# Patient Record
Sex: FEMALE | Employment: OTHER | URBAN - METROPOLITAN AREA
[De-identification: names, ages, dates, MRNs, and addresses within clinical notes are randomized per-mention and may not be internally consistent; named-entity substitution may affect disease eponyms.]

---

## 2018-11-17 ENCOUNTER — APPOINTMENT (OUTPATIENT)
Dept: MRI IMAGING | Age: 73
End: 2018-11-17
Attending: INTERNAL MEDICINE
Payer: MEDICARE

## 2018-11-17 ENCOUNTER — APPOINTMENT (OUTPATIENT)
Dept: CT IMAGING | Age: 73
End: 2018-11-17
Attending: INTERNAL MEDICINE
Payer: MEDICARE

## 2018-11-17 ENCOUNTER — HOSPITAL ENCOUNTER (OUTPATIENT)
Age: 73
Setting detail: OBSERVATION
Discharge: HOME | End: 2018-11-18
Attending: HOSPITALIST | Admitting: INTERNAL MEDICINE
Payer: MEDICARE

## 2018-11-17 ENCOUNTER — APPOINTMENT (OUTPATIENT)
Dept: GENERAL RADIOLOGY | Age: 73
End: 2018-11-17
Attending: INTERNAL MEDICINE
Payer: MEDICARE

## 2018-11-17 DIAGNOSIS — R55 SYNCOPE AND COLLAPSE: ICD-10-CM

## 2018-11-17 LAB
ALBUMIN SERPL-MCNC: 3.6 G/DL (ref 3.5–5)
ALBUMIN/GLOB SERPL: 0.9 {RATIO} (ref 1.1–2.2)
ALP SERPL-CCNC: 78 U/L (ref 45–117)
ALT SERPL-CCNC: 28 U/L (ref 12–78)
ANION GAP SERPL CALC-SCNC: 7 MMOL/L (ref 5–15)
AST SERPL-CCNC: 21 U/L (ref 15–37)
BILIRUB SERPL-MCNC: 0.6 MG/DL (ref 0.2–1)
BNP SERPL-MCNC: 1604 PG/ML (ref 0–125)
BUN SERPL-MCNC: 14 MG/DL (ref 6–20)
BUN/CREAT SERPL: 13 (ref 12–20)
CALCIUM SERPL-MCNC: 9.1 MG/DL (ref 8.5–10.1)
CHLORIDE SERPL-SCNC: 111 MMOL/L (ref 97–108)
CK MB CFR SERPL CALC: 1.6 % (ref 0–2.5)
CK MB CFR SERPL CALC: 2.1 % (ref 0–2.5)
CK MB SERPL-MCNC: 2.8 NG/ML (ref 5–25)
CK MB SERPL-MCNC: 3.1 NG/ML (ref 5–25)
CK SERPL-CCNC: 135 U/L (ref 26–192)
CK SERPL-CCNC: 189 U/L (ref 26–192)
CO2 SERPL-SCNC: 23 MMOL/L (ref 21–32)
CREAT SERPL-MCNC: 1.04 MG/DL (ref 0.55–1.02)
GLOBULIN SER CALC-MCNC: 3.9 G/DL (ref 2–4)
GLUCOSE BLD STRIP.AUTO-MCNC: 152 MG/DL (ref 65–100)
GLUCOSE BLD STRIP.AUTO-MCNC: 200 MG/DL (ref 65–100)
GLUCOSE BLD STRIP.AUTO-MCNC: 202 MG/DL (ref 65–100)
GLUCOSE BLD STRIP.AUTO-MCNC: 224 MG/DL (ref 65–100)
GLUCOSE SERPL-MCNC: 220 MG/DL (ref 65–100)
MAGNESIUM SERPL-MCNC: 2.4 MG/DL (ref 1.6–2.4)
PHOSPHATE SERPL-MCNC: 2.9 MG/DL (ref 2.6–4.7)
POTASSIUM SERPL-SCNC: 4.6 MMOL/L (ref 3.5–5.1)
PROT SERPL-MCNC: 7.5 G/DL (ref 6.4–8.2)
SERVICE CMNT-IMP: ABNORMAL
SODIUM SERPL-SCNC: 141 MMOL/L (ref 136–145)
TROPONIN I SERPL-MCNC: <0.05 NG/ML
TROPONIN I SERPL-MCNC: <0.05 NG/ML
TSH SERPL DL<=0.05 MIU/L-ACNC: 1.12 UIU/ML (ref 0.36–3.74)

## 2018-11-17 PROCEDURE — 93880 EXTRACRANIAL BILAT STUDY: CPT

## 2018-11-17 PROCEDURE — 36415 COLL VENOUS BLD VENIPUNCTURE: CPT

## 2018-11-17 PROCEDURE — 70486 CT MAXILLOFACIAL W/O DYE: CPT

## 2018-11-17 PROCEDURE — 93005 ELECTROCARDIOGRAM TRACING: CPT

## 2018-11-17 PROCEDURE — 80053 COMPREHEN METABOLIC PANEL: CPT

## 2018-11-17 PROCEDURE — 83735 ASSAY OF MAGNESIUM: CPT

## 2018-11-17 PROCEDURE — 84443 ASSAY THYROID STIM HORMONE: CPT

## 2018-11-17 PROCEDURE — 70551 MRI BRAIN STEM W/O DYE: CPT

## 2018-11-17 PROCEDURE — 74011250636 HC RX REV CODE- 250/636: Performed by: INTERNAL MEDICINE

## 2018-11-17 PROCEDURE — 73630 X-RAY EXAM OF FOOT: CPT

## 2018-11-17 PROCEDURE — 74011636637 HC RX REV CODE- 636/637: Performed by: INTERNAL MEDICINE

## 2018-11-17 PROCEDURE — 83880 ASSAY OF NATRIURETIC PEPTIDE: CPT

## 2018-11-17 PROCEDURE — 99218 HC RM OBSERVATION: CPT

## 2018-11-17 PROCEDURE — 84484 ASSAY OF TROPONIN QUANT: CPT

## 2018-11-17 PROCEDURE — 96372 THER/PROPH/DIAG INJ SC/IM: CPT

## 2018-11-17 PROCEDURE — 70544 MR ANGIOGRAPHY HEAD W/O DYE: CPT

## 2018-11-17 PROCEDURE — 74011250637 HC RX REV CODE- 250/637: Performed by: INTERNAL MEDICINE

## 2018-11-17 PROCEDURE — 82962 GLUCOSE BLOOD TEST: CPT

## 2018-11-17 PROCEDURE — 82553 CREATINE MB FRACTION: CPT

## 2018-11-17 PROCEDURE — 84100 ASSAY OF PHOSPHORUS: CPT

## 2018-11-17 RX ORDER — CARVEDILOL 12.5 MG/1
12.5 TABLET ORAL 2 TIMES DAILY WITH MEALS
Status: DISCONTINUED | OUTPATIENT
Start: 2018-11-17 | End: 2018-11-18 | Stop reason: HOSPADM

## 2018-11-17 RX ORDER — ONDANSETRON 2 MG/ML
4 INJECTION INTRAMUSCULAR; INTRAVENOUS
Status: DISCONTINUED | OUTPATIENT
Start: 2018-11-17 | End: 2018-11-18 | Stop reason: HOSPADM

## 2018-11-17 RX ORDER — CARVEDILOL 12.5 MG/1
TABLET ORAL 2 TIMES DAILY WITH MEALS
COMMUNITY

## 2018-11-17 RX ORDER — FUROSEMIDE 20 MG/1
TABLET ORAL DAILY
COMMUNITY

## 2018-11-17 RX ORDER — INSULIN LISPRO 100 [IU]/ML
INJECTION, SOLUTION INTRAVENOUS; SUBCUTANEOUS
Status: DISCONTINUED | OUTPATIENT
Start: 2018-11-17 | End: 2018-11-18 | Stop reason: HOSPADM

## 2018-11-17 RX ORDER — MAGNESIUM SULFATE 100 %
4 CRYSTALS MISCELLANEOUS AS NEEDED
Status: DISCONTINUED | OUTPATIENT
Start: 2018-11-17 | End: 2018-11-18 | Stop reason: HOSPADM

## 2018-11-17 RX ORDER — BISACODYL 5 MG
5 TABLET, DELAYED RELEASE (ENTERIC COATED) ORAL DAILY PRN
Status: DISCONTINUED | OUTPATIENT
Start: 2018-11-17 | End: 2018-11-18 | Stop reason: HOSPADM

## 2018-11-17 RX ORDER — SODIUM CHLORIDE 0.9 % (FLUSH) 0.9 %
10 SYRINGE (ML) INJECTION
Status: ACTIVE | OUTPATIENT
Start: 2018-11-17 | End: 2018-11-18

## 2018-11-17 RX ORDER — SODIUM CHLORIDE 0.9 % (FLUSH) 0.9 %
5-10 SYRINGE (ML) INJECTION EVERY 8 HOURS
Status: DISCONTINUED | OUTPATIENT
Start: 2018-11-17 | End: 2018-11-18 | Stop reason: HOSPADM

## 2018-11-17 RX ORDER — ACETAMINOPHEN 325 MG/1
650 TABLET ORAL
Status: DISCONTINUED | OUTPATIENT
Start: 2018-11-17 | End: 2018-11-18 | Stop reason: HOSPADM

## 2018-11-17 RX ORDER — DEXTROSE 50 % IN WATER (D50W) INTRAVENOUS SYRINGE
12.5-25 AS NEEDED
Status: DISCONTINUED | OUTPATIENT
Start: 2018-11-17 | End: 2018-11-18 | Stop reason: HOSPADM

## 2018-11-17 RX ORDER — ENOXAPARIN SODIUM 100 MG/ML
40 INJECTION SUBCUTANEOUS EVERY 24 HOURS
Status: DISCONTINUED | OUTPATIENT
Start: 2018-11-17 | End: 2018-11-18 | Stop reason: HOSPADM

## 2018-11-17 RX ORDER — METFORMIN HYDROCHLORIDE 1000 MG/1
1000 TABLET ORAL 2 TIMES DAILY WITH MEALS
COMMUNITY

## 2018-11-17 RX ORDER — SODIUM CHLORIDE 0.9 % (FLUSH) 0.9 %
5-10 SYRINGE (ML) INJECTION AS NEEDED
Status: DISCONTINUED | OUTPATIENT
Start: 2018-11-17 | End: 2018-11-18 | Stop reason: HOSPADM

## 2018-11-17 RX ORDER — FUROSEMIDE 20 MG/1
20 TABLET ORAL DAILY
Status: DISCONTINUED | OUTPATIENT
Start: 2018-11-17 | End: 2018-11-18 | Stop reason: HOSPADM

## 2018-11-17 RX ADMIN — Medication 10 ML: at 08:39

## 2018-11-17 RX ADMIN — INSULIN LISPRO 3 UNITS: 100 INJECTION, SOLUTION INTRAVENOUS; SUBCUTANEOUS at 11:30

## 2018-11-17 RX ADMIN — FUROSEMIDE 20 MG: 20 TABLET ORAL at 08:28

## 2018-11-17 RX ADMIN — CARVEDILOL 12.5 MG: 12.5 TABLET, FILM COATED ORAL at 08:28

## 2018-11-17 RX ADMIN — SACUBITRIL AND VALSARTAN 1 TABLET: 24; 26 TABLET, FILM COATED ORAL at 09:00

## 2018-11-17 RX ADMIN — SACUBITRIL AND VALSARTAN 1 TABLET: 24; 26 TABLET, FILM COATED ORAL at 18:15

## 2018-11-17 RX ADMIN — Medication 10 ML: at 14:00

## 2018-11-17 RX ADMIN — ENOXAPARIN SODIUM 40 MG: 40 INJECTION SUBCUTANEOUS at 09:00

## 2018-11-17 RX ADMIN — Medication 10 ML: at 23:23

## 2018-11-17 NOTE — PROGRESS NOTES
Hospitalist progress note Pt seen earlier today by my colleague Dr. Sebastien Leal Syncope, transient, no fall, no head injury No seizure like movement noticed by the witness Recent travel from [place] yesterday to [place] via car ?taken morning meds twice yesterday No prior episodes No recurrent syncope in the hospital 
No arrhythmias noted on rhythm strip Exam: 
Heart S1S2, RRR 
CNS exam: CN2-12 intact, motor and sens intact, no focal deficit Lungs: CTA Abd: soft, NG, NT, NR 
Gen: AAO x 3, no distress 
 
assessment/Plan:  
-syncope 
-Hx if cardiomyopathy, likely takutsubo Plan: 
syncope Likely due to meds R/o PE, stroke MRI brain neg for stroke CT faciomaxilarry: no fracture Check EKG, CTPE (synope can be caused by PE), check Orthostats Neuro consult PT/OT consult Fall precautions Neuro-monitored bed Cont home meds as appropriate Current meds: 
 
Current Facility-Administered Medications:  
  carvedilol (COREG) tablet 12.5 mg, 12.5 mg, Oral, BID WITH MEALS, Keisha Enrique MD, 12.5 mg at 11/17/18 2322   furosemide (LASIX) tablet 20 mg, 20 mg, Oral, DAILY, Keisha Enrique MD, 20 mg at 11/17/18 6360   sacubitril-valsartan (ENTRESTO) 24-26 mg tablet 1 Tab, 1 Tab, Oral, BID, Keisha Enrique MD, 1 Tab at 11/17/18 0900 
  sodium chloride (NS) flush 5-10 mL, 5-10 mL, IntraVENous, Q8H, Keisha Enriuqe MD, 10 mL at 11/17/18 0839 
  sodium chloride (NS) flush 5-10 mL, 5-10 mL, IntraVENous, PRN, Keisha Enrique MD 
  acetaminophen (TYLENOL) tablet 650 mg, 650 mg, Oral, Q4H PRN, Keisha Enrique MD 
  ondansetron (ZOFRAN) injection 4 mg, 4 mg, IntraVENous, Q4H PRN, Keisha Enrique MD 
  bisacodyl (DULCOLAX) tablet 5 mg, 5 mg, Oral, DAILY PRN, Keisha Enrique MD 
  enoxaparin (LOVENOX) injection 40 mg, 40 mg, SubCUTAneous, Q24H, Keisha Enrique MD, 40 mg at 11/17/18 0900 
  insulin lispro (HUMALOG) injection, , SubCUTAneous, AC&HS, Errol Webster MD 
   glucose chewable tablet 16 g, 4 Tab, Oral, PRN, Keisha Enrique MD 
  dextrose (D50W) injection syrg 12.5-25 g, 12.5-25 g, IntraVENous, PRN, Keisha Enrique MD 
  glucagon (GLUCAGEN) injection 1 mg, 1 mg, IntraMUSCular, PRN, Keisha Enrique MD  
 
Diagnostic date: 
Recent Results (from the past 12 hour(s)) TSH 3RD GENERATION Collection Time: 11/17/18  9:18 AM  
Result Value Ref Range TSH 1.12 0.36 - 3.74 uIU/mL PHOSPHORUS Collection Time: 11/17/18  9:18 AM  
Result Value Ref Range Phosphorus 2.9 2.6 - 4.7 MG/DL MAGNESIUM Collection Time: 11/17/18  9:18 AM  
Result Value Ref Range Magnesium 2.4 1.6 - 2.4 mg/dL TROPONIN I Collection Time: 11/17/18  9:18 AM  
Result Value Ref Range Troponin-I, Qt. <0.05 <0.05 ng/mL CK W/ CKMB & INDEX Collection Time: 11/17/18  9:18 AM  
Result Value Ref Range  26 - 192 U/L  
 CK - MB 2.8 <3.6 NG/ML  
 CK-MB Index 2.1 0 - 2.5 NT-PRO BNP Collection Time: 11/17/18  9:18 AM  
Result Value Ref Range NT pro-BNP 1,604 (H) 0 - 125 PG/ML  
GLUCOSE, POC Collection Time: 11/17/18  9:19 AM  
Result Value Ref Range Glucose (POC) 224 (H) 65 - 100 mg/dL Performed by Tanika Abarca GLUCOSE, POC Collection Time: 11/17/18 12:50 PM  
Result Value Ref Range Glucose (POC) 200 (H) 65 - 100 mg/dL Performed by Tanika Abarca Xr Foot Lt Min 3 V Result Date: 11/17/2018 EXAM:  XR FOOT LT MIN 3 V INDICATION:   trauma left foot. COMPARISON:  None. FINDINGS:  Three views of the left foot demonstrate no fracture or other acute osseous or articular abnormality. The soft tissues are within normal limits. IMPRESSION:  No acute abnormality. Mra Brain Wo Cont Result Date: 11/17/2018 INDICATION:  Syncope Time of flight MRA the brain was performed. Bilateral internal carotid arteries are patent. Basilar arteries patent.  Visualized portions of anterior, middle, and posterior cerebral arteries appear normal. No aneurysm or vascular malformation. There is fetal origin right posterior cerebral artery. IMPRESSION: Negative MRA of the brain Mri Brain Wo Cont Result Date: 11/17/2018 INDICATION:  syncope Sagittal, axial, and coronal MRI of the brain without contrast FINDINGS: There are multiple foci of high signal intensity in the deep white matter bilaterally. Findings are nonspecific but usually associated with chronic ischemic change. There is no intracranial mass or hemorrhage The signal void in the vessels at the base of the brain is present and normal. No evidence for restricted diffusion. IMPRESSION: Nonspecific high signal in the deep white matter likely due to chronic ischemic change. Otherwise negative. Ct Maxillofacial Wo Cont Result Date: 11/17/2018 EXAM:  CT MAXILLOFACIAL WO CONT INDICATION:   fracture nose COMPARISON:  None. TECHNIQUE:  Multislice helical CT of the facial bones was performed in the axial plane without intravenous contrast administration. Coronal and sagittal reformations were generated. CT dose reduction was achieved through use of a standardized protocol tailored for this examination and automatic exposure control for dose modulation. FINDINGS: No visible displaced fracture. Mucosal thickening in the right maxillary sinus. The globes, optic nerves and extraocular muscles are normal. No abnormalities are identified within the visualized portions of the nasopharynx. Intracranial atherosclerosis. IMPRESSION: 1. No visible displaced fracture

## 2018-11-17 NOTE — PROCEDURES
[address]  *** FINAL REPORT ***    Name: Rosie Saldivar  MRN: [id number]    Outpatient  : 15 Feb 1945  HIS Order #: [id number]  [address] [facility] Visit #: [id number]  Date: 2018    TYPE OF TEST: Cerebrovascular Duplex    REASON FOR TEST  Syncope    Right Carotid:-             Proximal               Mid                 Distal  cm/s  Systolic  Diastolic  Systolic  Diastolic  Systolic  Diastolic  CCA:     44.0      12.0                            69.0      14.0  Bulb:  ECA:    122.0  ICA:    111.0      28.0      109.0      32.0      103.0      27.0  ICA/CCA:  1.6       2.0    ICA Stenosis:    Right Vertebral:-  Finding: Antegrade  Sys:      141.0  Nadia:       31.0    Right Subclavian:    Left Carotid:-            Proximal                Mid                 Distal  cm/s  Systolic  Diastolic  Systolic  Diastolic  Systolic  Diastolic  CCA:    326.4      12.0                           101.0      16.0  Bulb:  ECA:    104.0  ICA:    104.0      25.0       91.0      21.0       79.0      17.0  ICA/CCA:  1.0       1.6    ICA Stenosis:    Left Vertebral:-  Finding: Antegrade  Sys:      109.0  Nadia:    Left Subclavian:    INTERPRETATION/FINDINGS  PROCEDURE:  Color duplex ultrasound imaging of extracranial  cerebrovascular arteries. FINDINGS:       Right:  Internal carotid velocity is within normal limits. There   is narrowing of the internal carotid flow channel on color Doppler  imaging and mixed density plaque on B-mode imaging, consistent with  less than 50 percent stenosis. The common and external carotid  arteries are patent and without evidence of hemodynamically  significant stenosis. Left:   Internal carotid velocity is within normal limits. There   is narrowing of the internal carotid flow channel on color Doppler  imaging and mixed density plaque on B-mode imaging, consistent with  less than 50 percent stenosis.   The common and external carotid  arteries are patent and without evidence of hemodynamically  significant stenosis. IMPRESSION:  Consistent with less than 50% stenosis of the right  internal carotid and less than 50% stenosis of the left internal  carotid. Vertebrals are patent with antegrade flow. ADDITIONAL COMMENTS    I have personally reviewed the data relevant to the interpretation of  this  study.     TECHNOLOGIST: Troy Green RVT  Signed: 11/17/2018 02:17 PM    PHYSICIAN: Loreto Finch MD  Signed: 11/18/2018 02:53 PM

## 2018-11-17 NOTE — PROGRESS NOTES
TRANSFER - IN REPORT: 
 
Verbal report received from LESLIE HALL [facility]) on Hugo Flood  being received from [facility](unit) for routine progression of care Report consisted of patients Situation, Background, Assessment and  
Recommendations(SBAR). Information from the following report(s) SBAR, ED Summary, Procedure Summary and Recent Results was reviewed with the receiving nurse. Opportunity for questions and clarification was provided. Assessment completed upon patients arrival to unit and care assumed.

## 2018-11-17 NOTE — CONSULTS
Consult dictated. Episode of syncope. Thinks that she may have had her morning medication twice. Suspect vasovagal or related to hypotension but agree with work up as ordered.    Kayli Robles MD

## 2018-11-17 NOTE — H&P
[address]  HISTORY AND PHYSICAL Kaleb Lr 
MR#: [id number] : 1945 ACCOUNT #: [de-identified] ADMIT DATE: 2018 PRIMARY CARE PHYSICIAN:  Unknown. SOURCE OF INFORMATION:  Patient. CHIEF COMPLAINT:  Passed out. HISTORY OF PRESENT ILLNESS:  This is a 51-year-old woman with a past medical history significant for type 2 diabetes, hypertension, bilateral breast cancer status post bilateral lumpectomy, radiation and chemotherapy, chronic systolic congestive heart failure. Was in her usual state of health until the day of presentation at the emergency room when patient passed out. Patient was visiting a relative at [place], [place]. She was she was sitting among relatives about to eat dinner, when all of a sudden the patient passed out. The patient had no recollection of the event, but according to the report patient was unconscious with her eyes rolling backward. There was no abnormal body movement. Patient was taken to [facility] for further evaluation. When the patient arrived at the emergency room, CT scan of the head was obtained. This was negative. As the [facility] [place] has no neurologist or cardiologist service, the hospitalist service at [facility] was consulted for direct admission for a higher level of care. The patient was directly admitted from Dr. Samanta Chacon to [address] [place] [address] for further evaluation of suspected syncope. Patient lives in [place], [place]. Her doctors are all in a [facility].  She stated that she was recently seen by a cardiologist, who gave her a clean bill of health. Patient has been taking medication as prescribed. No associated fever, no rigors and no chills. She stated that an object fell on the left foot a couple of days ago.   Patient did not seek medical treatment and now she is experiencing pain in the left foot and requesting evaluation of the left foot. According to the patient, the pain in the left foot is constant. It is without  radiation, worse with movement. No known relieving factors, but 6/10 in severity. She also stated that an object fell on her nose at home before coming to [place]. PAST MEDICAL HISTORY:  Type 2 diabetes, hypertension, chronic systolic congestive heart failure, bilateral breast cancer, status post bilateral lumpectomy, radiation and chemotherapy. ALLERGIES:  PATIENT IS ALLERGIC TO PENICILLIN, SULFA, BACTRIM. MEDICATIONS:  Glucophage 1000 mg twice daily, Lasix 20 mg daily, Entresto 1 tablet twice daily, Coreg 12.5 mg twice a day. FAMILY HISTORY:  This was reviewed. Her mother had hypertension. PAST SURGICAL HISTORY:  This is significant for bilateral lumpectomy secondary to breast cancer, abdominal surgery to have a kidney stone removed. SOCIAL HISTORY:  Patient is a former smoker. No history of alcohol abuse. REVIEW OF SYSTEMS: 
HEAD, EYES, EARS, NOSE AND THROAT:  This is positive for syncope. No headache, no blurring of vision, no photophobia. RESPIRATORY:  No cough, no shortness of breath and no hemoptysis. CARDIOVASCULAR:  No chest pain, no orthopnea, no palpitations. GASTROINTESTINAL:  No nausea and vomiting, no diarrhea, no constipation. GENITOURINARY:  No dysuria, no urgency and no frequency. All other systems are reviewed and they are negative. PHYSICAL EXAMINATION: 
GENERAL APPEARANCE:  The patient appeared ill, in moderate distress. VITAL SIGNS:  Temperature 97.5, pulse 96, blood pressure 156/78, oxygen saturation 99% on room air, respiratory rate 18. HEAD:  Normocephalic, atraumatic. EYES:  Normal eye movement. No redness, no drainage, no discharge. EARS:  Normal external ears with no obvious drainage. NOSE:  No deformity and no drainage. MOUTH AND THROAT:  No visible oral lesion. NECK:  Supple, no JVD, no thyromegaly. CHEST:  Clear breath sounds. No wheeze, no crackles. HEART:  Normal S1 and S2, regular. No clinically appreciable murmur. ABDOMEN:  Soft, nontender, normal bowel sounds. CENTRAL NERVOUS SYSTEM:  Alert, oriented x3. No gross focal neurological deficit. EXTREMITIES:  No edema. Pulses 2+ bilaterally. MUSCULOSKELETAL SYSTEM:  Mild tenderness, left foot, with mild swelling. SKIN:  Mild swelling of the nose with tenderness. Findings are present on admission. PSYCHIATRIC:  Normal mood and affect. LYMPHATIC SYSTEM:  No cervical lymphadenopathy. DIAGNOSTIC DATA:  EKG shows a normal sinus rhythm and no significant ST and T-wave abnormalities. Chest x-ray:  No acute pathology. CT scan of the head without contrast:  Negative. DIAGNOSTIC DATA:  Urinalysis: This is significant for negative nitrite, negative leuko esterase, negative for bacteria. Urine drug screen negative. D-dimer is 0.45. Magnesium 2.3. Chemistry:  Sodium 140, potassium 3.8, chloride at 104, CO2 of 23, BUN 17, creatinine 0.88, glucose 166, calcium 9.4, total protein at 8.6, albumin level 4.8, globulin at 3.8, alkaline phosphatase 84, total bilirubin 0.7. Hematology:  WBC 6.9, hemoglobin at 12.4, hematocrit 37.1, platelet 028. Cardiac profile:  Troponin negative. ASSESSMENT: 
1. Syncope. 2.  Type 2 diabetes. 3.  Hypertension. 4.  Bilateral breast cancer. 5.  Chronic systolic congestive heart failure. 6.  Left foot pain. 7.  Thrombocytopenia. PLAN: 
1. Syncope. We will admit the patient for further evaluation of syncope. We will obtain an MRI/MRA of the brain, carotid Doppler of the neck, as well as an echocardiogram to evaluate the patient for stroke as a possible cause of syncope. We will also obtain an EEG to evaluate the patient for atypical seizure as a possible cause of syncope. D-dimer is within normal limits.   We will check cardiac markers to rule out acute myocardial infarction as a possible cause of syncope. We will check a TSH level. Patient may require a neurology consult if the MRI is positive for stroke or if the EEG is positive for seizure. 2.  Type 2 diabetes. We will place the patient on a sliding scale with insulin coverage. We will hold oral agent until the time of discharge from the hospital. 
3.  Hypertension. We will continue with the preadmission medication. We will monitor the patient's blood pressure closely. We will check orthostatic vital signs. 4.  Bilateral breast cancer. Patient is status post bilateral mastectomy, chemotherapy and radiation. She will continue to follow with her oncologist upon discharge from the hospital for routine care. 5.  Chronic systolic congestive heart failure. We will continue with her preadmission medication. We will await the result of her echocardiogram to determine the ejection fraction. We will check serial cardiac markers to rule out acute myocardial infarction. 6.  Left foot pain. We will obtain an x-ray of the left foot to evaluate the patient for fracture. Since the patient also stated that an object fell on her nose, we will obtain a CT scan of the face to evaluate the patient for a nasal bone fracture. 7.  Thrombocytopenia. This is mild. Patient is asymptomatic. We will monitor the patient's  platelet count. OTHER ISSUES:  CODE STATUS:  THE PATIENT IS A FULL CODE. We will place the patient on Lovenox for DVT prophylaxis. If there is further drop in the patient's, will discontinue Lovenox and place the patient on SCD for DVT prophylaxis. MD ONELIA Nicole/ 
D: 11/17/2018 06:17    
T: 11/17/2018 12:04 JOB #: [id number]

## 2018-11-17 NOTE — PROGRESS NOTES
Problem: Falls - Risk of 
Goal: *Absence of Falls Document Edwina Joshi Fall Risk and appropriate interventions in the flowsheet. Outcome: Progressing Towards Goal 
Fall Risk Interventions: 
  
 
  
 
Medication Interventions: Patient to call before getting OOB, Teach patient to arise slowly Elimination Interventions: Call light in reach, Patient to call for help with toileting needs, Toileting schedule/hourly rounds Problem: Pressure Injury - Risk of 
Goal: *Prevention of pressure injury Document Mark Scale and appropriate interventions in the flowsheet. Outcome: Progressing Towards Goal 
Pressure Injury Interventions: 
  
 
  
 
  
 
  
 
Nutrition Interventions: Document food/fluid/supplement intake, Offer support with meals,snacks and hydration

## 2018-11-17 NOTE — PROGRESS NOTES
Problem: Pressure Injury - Risk of 
Goal: *Prevention of pressure injury Document Mark Scale and appropriate interventions in the flowsheet. Outcome: Progressing Towards Goal 
Pressure Injury Interventions: 
  
 
  
 
  
 
  
 
Nutrition Interventions: Document food/fluid/supplement intake

## 2018-11-17 NOTE — PROGRESS NOTES
Bedside shift change report given to Jessica Ochoa (oncoming nurse) by Keke Borden (offgoing nurse). Report included the following information SBAR, Kardex, Procedure Summary, MAR, Accordion, Recent Results and Cardiac Rhythm NSR.

## 2018-11-18 ENCOUNTER — HOSPITAL ENCOUNTER (OUTPATIENT)
Dept: CT IMAGING | Age: 73
Setting detail: OBSERVATION
End: 2018-11-18
Attending: INTERNAL MEDICINE
Payer: MEDICARE

## 2018-11-18 VITALS
TEMPERATURE: 98.1 F | DIASTOLIC BLOOD PRESSURE: 54 MMHG | HEART RATE: 78 BPM | WEIGHT: 152.6 LBS | BODY MASS INDEX: 25.43 KG/M2 | SYSTOLIC BLOOD PRESSURE: 133 MMHG | OXYGEN SATURATION: 96 % | HEIGHT: 65 IN | RESPIRATION RATE: 20 BRPM

## 2018-11-18 PROBLEM — R55 SYNCOPE AND COLLAPSE: Status: RESOLVED | Noted: 2018-11-17 | Resolved: 2018-11-18

## 2018-11-18 LAB
ANION GAP SERPL CALC-SCNC: 6 MMOL/L (ref 5–15)
ATRIAL RATE: 75 BPM
BASOPHILS # BLD: 0 K/UL (ref 0–0.1)
BASOPHILS NFR BLD: 1 % (ref 0–1)
BUN SERPL-MCNC: 13 MG/DL (ref 6–20)
BUN/CREAT SERPL: 14 (ref 12–20)
CALCIUM SERPL-MCNC: 8.6 MG/DL (ref 8.5–10.1)
CALCULATED P AXIS, ECG09: 146 DEGREES
CALCULATED R AXIS, ECG10: -25 DEGREES
CALCULATED T AXIS, ECG11: 167 DEGREES
CHLORIDE SERPL-SCNC: 112 MMOL/L (ref 97–108)
CHOLEST SERPL-MCNC: 164 MG/DL
CO2 SERPL-SCNC: 26 MMOL/L (ref 21–32)
CREAT SERPL-MCNC: 0.91 MG/DL (ref 0.55–1.02)
DIAGNOSIS, 93000: NORMAL
DIFFERENTIAL METHOD BLD: ABNORMAL
EOSINOPHIL # BLD: 0.1 K/UL (ref 0–0.4)
EOSINOPHIL NFR BLD: 2 % (ref 0–7)
ERYTHROCYTE [DISTWIDTH] IN BLOOD BY AUTOMATED COUNT: 13 % (ref 11.5–14.5)
EST. AVERAGE GLUCOSE BLD GHB EST-MCNC: 171 MG/DL
GLUCOSE BLD STRIP.AUTO-MCNC: 163 MG/DL (ref 65–100)
GLUCOSE BLD STRIP.AUTO-MCNC: 243 MG/DL (ref 65–100)
GLUCOSE SERPL-MCNC: 213 MG/DL (ref 65–100)
HBA1C MFR BLD: 7.6 % (ref 4.2–6.3)
HCT VFR BLD AUTO: 32.1 % (ref 35–47)
HDLC SERPL-MCNC: 67 MG/DL
HDLC SERPL: 2.4 {RATIO} (ref 0–5)
HGB BLD-MCNC: 10.3 G/DL (ref 11.5–16)
IMM GRANULOCYTES # BLD: 0 K/UL (ref 0–0.04)
IMM GRANULOCYTES NFR BLD AUTO: 0 % (ref 0–0.5)
LDLC SERPL CALC-MCNC: 74.6 MG/DL (ref 0–100)
LIPID PROFILE,FLP: NORMAL
LYMPHOCYTES # BLD: 2.5 K/UL (ref 0.8–3.5)
LYMPHOCYTES NFR BLD: 51 % (ref 12–49)
MCH RBC QN AUTO: 28.7 PG (ref 26–34)
MCHC RBC AUTO-ENTMCNC: 32.1 G/DL (ref 30–36.5)
MCV RBC AUTO: 89.4 FL (ref 80–99)
MONOCYTES # BLD: 0.4 K/UL (ref 0–1)
MONOCYTES NFR BLD: 9 % (ref 5–13)
NEUTS SEG # BLD: 1.8 K/UL (ref 1.8–8)
NEUTS SEG NFR BLD: 38 % (ref 32–75)
NRBC # BLD: 0 K/UL (ref 0–0.01)
NRBC BLD-RTO: 0 PER 100 WBC
P-R INTERVAL, ECG05: 134 MS
PLATELET # BLD AUTO: 119 K/UL (ref 150–400)
PMV BLD AUTO: 10.6 FL (ref 8.9–12.9)
POTASSIUM SERPL-SCNC: 3.7 MMOL/L (ref 3.5–5.1)
Q-T INTERVAL, ECG07: 444 MS
QRS DURATION, ECG06: 106 MS
QTC CALCULATION (BEZET), ECG08: 495 MS
RBC # BLD AUTO: 3.59 M/UL (ref 3.8–5.2)
SERVICE CMNT-IMP: ABNORMAL
SERVICE CMNT-IMP: ABNORMAL
SODIUM SERPL-SCNC: 144 MMOL/L (ref 136–145)
TRIGL SERPL-MCNC: 112 MG/DL (ref ?–150)
VENTRICULAR RATE, ECG03: 75 BPM
VLDLC SERPL CALC-MCNC: 22.4 MG/DL
WBC # BLD AUTO: 4.8 K/UL (ref 3.6–11)

## 2018-11-18 PROCEDURE — 36415 COLL VENOUS BLD VENIPUNCTURE: CPT

## 2018-11-18 PROCEDURE — 80048 BASIC METABOLIC PNL TOTAL CA: CPT

## 2018-11-18 PROCEDURE — 85025 COMPLETE CBC W/AUTO DIFF WBC: CPT

## 2018-11-18 PROCEDURE — 74011250637 HC RX REV CODE- 250/637: Performed by: INTERNAL MEDICINE

## 2018-11-18 PROCEDURE — 99218 HC RM OBSERVATION: CPT

## 2018-11-18 PROCEDURE — 82962 GLUCOSE BLOOD TEST: CPT

## 2018-11-18 PROCEDURE — 80061 LIPID PANEL: CPT

## 2018-11-18 PROCEDURE — 83036 HEMOGLOBIN GLYCOSYLATED A1C: CPT

## 2018-11-18 RX ADMIN — SACUBITRIL AND VALSARTAN 1 TABLET: 24; 26 TABLET, FILM COATED ORAL at 09:11

## 2018-11-18 RX ADMIN — CARVEDILOL 12.5 MG: 12.5 TABLET, FILM COATED ORAL at 09:11

## 2018-11-18 RX ADMIN — Medication 10 ML: at 07:04

## 2018-11-18 RX ADMIN — FUROSEMIDE 20 MG: 20 TABLET ORAL at 09:11

## 2018-11-18 NOTE — ROUTINE PROCESS
Bedside and Verbal shift change report given to Claude Sous, RN (oncoming nurse) by Shea Humphries RN (offgoing nurse). Report included the following information SBAR, Kardex, ED Summary, OR Summary, Procedure Summary, Intake/Output, MAR, Recent Results and Med Rec Status. NSR.

## 2018-11-18 NOTE — PROGRESS NOTES
Bedside shift change report given to Lena Don (oncoming nurse) by Courtney Lopez (offgoing nurse). Report included the following information SBAR, Kardex, Procedure Summary, MAR, Accordion, Recent Results and Cardiac Rhythm NSR.

## 2018-11-18 NOTE — DISCHARGE SUMMARY
Inpatient hospitalist discharge summary                Brief Overview    PATIENT ID: Jonathan Mendoza    MRN: [id number]     YOB: 1945    Admitting Provider: Adilene Perez MD    Discharging Provider: Merary Woodson MD   To contact this individual call [phone] and ask the  to page. If unavailable ask to be transferred the Adult Hospitalist Department. PCP at discharge: Unknown, Provider None   Patient not available to ask    Admission date: 11/17/2018  Date of Discharge: 11/18/18    Chief complaint: syncope  Patient Active Problem List   Diagnosis Code   (none) - all problems resolved or deleted         Primary discharge diagnosis:  Syncope, likely sec to accidental ingestion of antihypertensive twice. Acute stroke ruled out  Patient refused to have CT angio chest done to rule out a PE. Secondary discharge diagnosis:  NIDDM2  Hypertension  Hx of dilated cardiomyopathy with HFrEF, NYHA class II on admission and discharge  Bilateral breast ca s/p mastectomy, chemo and radiation    Discharge Disposition:  Home or Self Care    Discharge activity:  Ambulate in house   No driving for at least a week    Code status at discharge:  Full Code     Active issues requiring follow up:  Syncope    Outpatient follow up:  FU with PCP and cards      Future appointments-  No future appointments.   Follow-up Information     Follow up With Specialties Details Why Contact Info    Unknown, Provider    Patient not available to ask      Unknown, Provider   for post-hospitalization follow up, with in 7 days Patient not available to ask            Test results pending upon discharge:  n/a          Details of hospital stay      Presenting problem/ History of present illness:  syncope  Syncope and collapse    This is a 59-year-old woman with a past medical history significant for type 2 diabetes, hypertension, bilateral breast cancer status post bilateral lumpectomy, radiation and chemotherapy, chronic systolic congestive heart failure. Was in her usual state of health until the day of presentation at the emergency room when patient passed out. Patient was visiting a relative at [place], [place]. She was she was sitting among relatives about to eat dinner, when all of a sudden the patient passed out. The patient had no recollection of the event, but according to the report patient was unconscious with her eyes rolling backward. There was no abnormal body movement. Patient was taken to [facility] for further evaluation. When the patient arrived at the emergency room, CT scan of the head was obtained. This was negative. As the hospital has no neurologist or cardiologist service, the hospitalist service at [address] was consulted for direct admission for a higher level of care. The patient was directly admitted from  [facility] to [address] [place] [address] for further evaluation of suspected syncope. Patient lives in . Her doctors are all in a [facility].  She stated that she was recently seen by a cardiologist, who gave her a clean bill of health. Patient has been taking medication as prescribed. No associated fever, no rigors and no chills. She stated that an object fell on the left foot a couple of days ago. Patient did not seek medical treatment and now she is experiencing pain in the left foot and requesting evaluation of the left foot. According to the patient, the pain in the left foot is constant. It is without  radiation, worse with movement. No known relieving factors, but 6/10 in severity. She also stated that an object fell on her nose at home before coming to [place]. Hospital Course:  Pt had no recurrence of symptoms in the hospital. Brain imaging neg for stroke, dopplers carotids less 50% BL and CT maxillofacial neg for acute fractures. Patient remained hemodynamically stable.   She stated that she may have taken her morning meds twice accidently on the date of admission which are antihypertensives and could have caused her symptoms. Neurology was consulted and they also agreed to plan and possible etiology. No arrhythmias were noted. I strongly recommended CT angio PE protocol of chest to rule out PE as a cause of syncope but patient refused the CT chest. She understood pros and cons of it and wants to go home today. Since she is hemodynamically stable, no arrhythmias, no CHF exacerbation and no recurrence of symptoms and patient is able to ambulate without any issues, she was discharged to home in stable condition. On the date of discharge, diagnostic face to face encounter was performed. Patient was hemodynamically stable, offering no new complaints. Denies any shortness of breath at rest, no fevers or chills, no diarrhea or constipation. Patient is agreeable for discharge. Patient understood and verbalized the understanding of the discharge plan. Patient was advised to seek medical help/ care or return to ED, if symptoms recur, worsen or new symptoms develop. Operative procedures performed:      Treatments: IV hydration    Consults:  IP CONSULT TO NEUROLOGY    Procedures:    * No surgery found *    Diet:  Cardiac Diet and Diabetic Diet    Pertinent test results:  Xr Foot Lt Min 3 V    Result Date: 11/17/2018  EXAM:  XR FOOT LT MIN 3 V INDICATION:   trauma left foot. COMPARISON:  None. FINDINGS:  Three views of the left foot demonstrate no fracture or other acute osseous or articular abnormality. The soft tissues are within normal limits. IMPRESSION:  No acute abnormality. Mra Brain Wo Cont    Result Date: 11/17/2018  INDICATION:  Syncope Time of flight MRA the brain was performed. Bilateral internal carotid arteries are patent. Basilar arteries patent.  Visualized portions of anterior, middle, and posterior cerebral arteries appear normal. No aneurysm or vascular malformation. There is fetal origin right posterior cerebral artery. IMPRESSION: Negative MRA of the brain     Mri Brain Wo Cont    Result Date: 11/17/2018  INDICATION:  syncope Sagittal, axial, and coronal MRI of the brain without contrast FINDINGS: There are multiple foci of high signal intensity in the deep white matter bilaterally. Findings are nonspecific but usually associated with chronic ischemic change. There is no intracranial mass or hemorrhage The signal void in the vessels at the base of the brain is present and normal. No evidence for restricted diffusion. IMPRESSION: Nonspecific high signal in the deep white matter likely due to chronic ischemic change. Otherwise negative. Ct Maxillofacial Wo Cont    Result Date: 11/17/2018  EXAM:  CT MAXILLOFACIAL WO CONT INDICATION:   fracture nose COMPARISON:  None. TECHNIQUE:  Multislice helical CT of the facial bones was performed in the axial plane without intravenous contrast administration. Coronal and sagittal reformations were generated. CT dose reduction was achieved through use of a standardized protocol tailored for this examination and automatic exposure control for dose modulation. FINDINGS: No visible displaced fracture. Mucosal thickening in the right maxillary sinus. The globes, optic nerves and extraocular muscles are normal. No abnormalities are identified within the visualized portions of the nasopharynx. Intracranial atherosclerosis. IMPRESSION: 1.  No visible displaced fracture       Recent Results (from the past 336 hour(s))   TSH 3RD GENERATION    Collection Time: 11/17/18  9:18 AM   Result Value Ref Range    TSH 1.12 0.36 - 3.74 uIU/mL   PHOSPHORUS    Collection Time: 11/17/18  9:18 AM   Result Value Ref Range    Phosphorus 2.9 2.6 - 4.7 MG/DL   MAGNESIUM    Collection Time: 11/17/18  9:18 AM   Result Value Ref Range    Magnesium 2.4 1.6 - 2.4 mg/dL   TROPONIN I    Collection Time: 11/17/18  9:18 AM   Result Value Ref Range Troponin-I, Qt. <0.05 <0.05 ng/mL   CK W/ CKMB & INDEX    Collection Time: 11/17/18  9:18 AM   Result Value Ref Range     26 - 192 U/L    CK - MB 2.8 <3.6 NG/ML    CK-MB Index 2.1 0 - 2.5     NT-PRO BNP    Collection Time: 11/17/18  9:18 AM   Result Value Ref Range    NT pro-BNP 1,604 (H) 0 - 320 PG/ML   METABOLIC PANEL, COMPREHENSIVE    Collection Time: 11/17/18  9:18 AM   Result Value Ref Range    Sodium 141 136 - 145 mmol/L    Potassium 4.6 3.5 - 5.1 mmol/L    Chloride 111 (H) 97 - 108 mmol/L    CO2 23 21 - 32 mmol/L    Anion gap 7 5 - 15 mmol/L    Glucose 220 (H) 65 - 100 mg/dL    BUN 14 6 - 20 MG/DL    Creatinine 1.04 (H) 0.55 - 1.02 MG/DL    BUN/Creatinine ratio 13 12 - 20      GFR est AA >60 >60 ml/min/1.73m2    GFR est non-AA 52 (L) >60 ml/min/1.73m2    Calcium 9.1 8.5 - 10.1 MG/DL    Bilirubin, total 0.6 0.2 - 1.0 MG/DL    ALT (SGPT) 28 12 - 78 U/L    AST (SGOT) 21 15 - 37 U/L    Alk.  phosphatase 78 45 - 117 U/L    Protein, total 7.5 6.4 - 8.2 g/dL    Albumin 3.6 3.5 - 5.0 g/dL    Globulin 3.9 2.0 - 4.0 g/dL    A-G Ratio 0.9 (L) 1.1 - 2.2     GLUCOSE, POC    Collection Time: 11/17/18  9:19 AM   Result Value Ref Range    Glucose (POC) 224 (H) 65 - 100 mg/dL    Performed by [address] [place] [address], POC    Collection Time: 11/17/18 12:50 PM   Result Value Ref Range    Glucose (POC) 200 (H) 65 - 100 mg/dL    Performed by Tisha Dougherty    EKG, 12 LEAD, INITIAL    Collection Time: 11/17/18  1:22 PM   Result Value Ref Range    Ventricular Rate 75 BPM    Atrial Rate 75 BPM    P-R Interval 134 ms    QRS Duration 106 ms    Q-T Interval 444 ms    QTC Calculation (Bezet) 495 ms    Calculated P Axis 146 degrees    Calculated R Axis -25 degrees    Calculated T Axis 167 degrees    Diagnosis       Unusual P axis, possible ectopic atrial rhythm  Minimal voltage criteria for LVH, may be normal variant  Inferior infarct , age undetermined  Anterior infarct , age undetermined  No previous ECGs available TROPONIN I    Collection Time: 11/17/18  3:30 PM   Result Value Ref Range    Troponin-I, Qt. <0.05 <0.05 ng/mL   CK W/ CKMB & INDEX    Collection Time: 11/17/18  3:30 PM   Result Value Ref Range     26 - 192 U/L    CK - MB 3.1 <3.6 NG/ML    CK-MB Index 1.6 0 - 2.5     GLUCOSE, POC    Collection Time: 11/17/18  5:10 PM   Result Value Ref Range    Glucose (POC) 152 (H) 65 - 100 mg/dL    Performed by Luis Wellington    GLUCOSE, POC    Collection Time: 11/17/18 10:01 PM   Result Value Ref Range    Glucose (POC) 202 (H) 65 - 100 mg/dL    Performed by Abebe Johns    HEMOGLOBIN A1C WITH EAG    Collection Time: 11/18/18  3:10 AM   Result Value Ref Range    Hemoglobin A1c 7.6 (H) 4.2 - 6.3 %    Est. average glucose 171 mg/dL   CBC WITH AUTOMATED DIFF    Collection Time: 11/18/18  3:10 AM   Result Value Ref Range    WBC 4.8 3.6 - 11.0 K/uL    RBC 3.59 (L) 3.80 - 5.20 M/uL    HGB 10.3 (L) 11.5 - 16.0 g/dL    HCT 32.1 (L) 35.0 - 47.0 %    MCV 89.4 80.0 - 99.0 FL    MCH 28.7 26.0 - 34.0 PG    MCHC 32.1 30.0 - 36.5 g/dL    RDW 13.0 11.5 - 14.5 %    PLATELET 984 (L) 347 - 400 K/uL    MPV 10.6 8.9 - 12.9 FL    NRBC 0.0 0  WBC    ABSOLUTE NRBC 0.00 0.00 - 0.01 K/uL    NEUTROPHILS 38 32 - 75 %    LYMPHOCYTES 51 (H) 12 - 49 %    MONOCYTES 9 5 - 13 %    EOSINOPHILS 2 0 - 7 %    BASOPHILS 1 0 - 1 %    IMMATURE GRANULOCYTES 0 0.0 - 0.5 %    ABS. NEUTROPHILS 1.8 1.8 - 8.0 K/UL    ABS. LYMPHOCYTES 2.5 0.8 - 3.5 K/UL    ABS. MONOCYTES 0.4 0.0 - 1.0 K/UL    ABS. EOSINOPHILS 0.1 0.0 - 0.4 K/UL    ABS. BASOPHILS 0.0 0.0 - 0.1 K/UL    ABS. IMM.  GRANS. 0.0 0.00 - 0.04 K/UL    DF AUTOMATED     METABOLIC PANEL, BASIC    Collection Time: 11/18/18  3:10 AM   Result Value Ref Range    Sodium 144 136 - 145 mmol/L    Potassium 3.7 3.5 - 5.1 mmol/L    Chloride 112 (H) 97 - 108 mmol/L    CO2 26 21 - 32 mmol/L    Anion gap 6 5 - 15 mmol/L    Glucose 213 (H) 65 - 100 mg/dL    BUN 13 6 - 20 MG/DL    Creatinine 0.91 0.55 - 1.02 MG/DL BUN/Creatinine ratio 14 12 - 20      GFR est AA >60 >60 ml/min/1.73m2    GFR est non-AA >60 >60 ml/min/1.73m2    Calcium 8.6 8.5 - 10.1 MG/DL   LIPID PANEL    Collection Time: 18  3:10 AM   Result Value Ref Range    LIPID PROFILE          Cholesterol, total 164 <200 MG/DL    Triglyceride 112 <150 MG/DL    HDL Cholesterol 67 MG/DL    LDL, calculated 74.6 0 - 100 MG/DL    VLDL, calculated 22.4 MG/DL    CHOL/HDL Ratio 2.4 0 - 5.0     GLUCOSE, POC    Collection Time: 18  7:22 AM   Result Value Ref Range    Glucose (POC) 163 (H) 65 - 100 mg/dL    Performed by Starlette Button            Physical Exam on Discharge:    Discharge condition: stable    Vital signs:   Patient Vitals for the past 12 hrs:   Temp Pulse Resp BP SpO2   18 0911  90  142/55    18 0715 97.7 °F (36.5 °C) 64 19 149/51    18 0315 98.4 °F (36.9 °C) 65 12 139/47    18 2315 98.4 °F (36.9 °C) 69 22 153/60 95 %       General appearance: alert, cooperative, no distress, appears stated age  Lungs: clear to auscultation bilaterally  Heart: regular rate and rhythm, S1, S2 normal, no murmur, click, rub or gallop  Neurologic: Alert and oriented X 3, normal strength and tone. Normal symmetric reflexes. Normal coordination and gait    Current Discharge Medication List      CONTINUE these medications which have NOT CHANGED    Details   metFORMIN (GLUCOPHAGE) 1,000 mg tablet Take 1,000 mg by mouth two (2) times daily (with meals). furosemide (LASIX) 20 mg tablet Take  by mouth daily. sacubitril-valsartan (ENTRESTO) 24 mg/26 mg tablet Take 1 Tab by mouth two (2) times a day. carvedilol (COREG) 12.5 mg tablet Take  by mouth two (2) times daily (with meals). Total time spent on discharge plannin minutes    Marquis Lina MD  18  9:53 AM        NOTIFY YOUR PHYSICIAN FOR ANY OF THE FOLLOWING:   Fever over 101 degrees for 24 hours.    Chest pain, shortness of breath, fever, chills, nausea, vomiting, diarrhea, change in mentation, falling, weakness, bleeding. Severe pain or pain not relieved by medications. Or, any other signs or symptoms that you may have questions about.

## 2018-11-18 NOTE — PROGRESS NOTES
Occupational Therapy Note 11/18/2018 Orders acknowledged, chart reviewed. Noted pt admitted for syncope. MRI of brain negative. Spoke with RN, Ambar Austin, who reports pt is up ad beatriz without difficulties with ADLs or mobility and no neurological deficits to indicate formal OT evaluation. Will sign-off at this time.  
 
Kathryn Singer, OTR/L

## 2018-11-18 NOTE — ROUTINE PROCESS
I have reviewed discharge instructions with the patient. The patient verbalized understanding. PIV and Telemetry discontinued. Pt discharged home, transferred via car by her sister in law. Signed copy of AVS placed on chart, no new medications added. No visible s/s of distress.

## 2018-11-18 NOTE — PROGRESS NOTES
Physical Therapy Note 11/18/2018 
  
Orders acknowledged, chart reviewed. Noted pt admitted for syncope. MRI of brain negative. Spoke with RN, Asia Grullon, who reports pt is up ad beatriz without difficulties with ADLs or mobility and no neurological deficits to indicate formal PT evaluation. Will sign-off at this time. 
  
Jumana Rosario, PT, DPT

## 2018-11-18 NOTE — PROGRESS NOTES
Problem: Falls - Risk of 
Goal: *Absence of Falls Document Dom Olivaser Fall Risk and appropriate interventions in the flowsheet. Outcome: Progressing Towards Goal 
Fall Risk Interventions: 
  
 
  
 
Medication Interventions: Patient to call before getting OOB, Teach patient to arise slowly Elimination Interventions: Call light in reach, Patient to call for help with toileting needs, Toileting schedule/hourly rounds Problem: Pressure Injury - Risk of 
Goal: *Prevention of pressure injury Document Mark Scale and appropriate interventions in the flowsheet. Outcome: Progressing Towards Goal 
Pressure Injury Interventions: 
  
 
  
 
  
 
  
 
Nutrition Interventions: Document food/fluid/supplement intake

## 2018-11-18 NOTE — CONSULTS
Shilo Vigil  MR#: [id number]  : 1945  ACCOUNT #: [de-identified]   DATE OF SERVICE: 2018    REQUESTING PHYSICIAN:  Tara Gore MD    REASON FOR EVALUATION:  Syncope. HISTORY OF PRESENT ILLNESS:  This is a 42-year-old -American female with history of type 2 diabetes, hypertension, bilateral breast cancer, status post lumpectomy, radiation and chemotherapy and chronic congestive heart failure. She lives in [place] and was visiting family in [place], [place]. She left [facility] about 3:00 in the afternoon and reached [facility] around 5:00. She was there sitting at the kitchen table talking to her family. Then, she does not remember what happened to her and the next thing she knows is waking up in the ambulance. Her family reported that she fell backwards, eyes rolled back and then she slumped forward onto the table. They let her sit there until the paramedics arrived, which was about 10 minutes later. As she was put in the ambulance or in a recumbent position she started to come around and then remembers everything from that point on. There was no witnessed seizure, tongue bite or bladder incontinence. No similar episodes in the past.  She does recall that she may have taken her morning medications including Entresto, Coreg and Glucophage twice that morning. She did not take her Lasix. She denies any associated headache, changes in vision, chest pain, shortness of breath, palpitations, diaphoresis or feeling of lightheadedness. She was admitted initially to [facility] ED from where she was transferred over for further care to [address] [place] [address] 12:  As mentioned above. ALLERGIES:  PENICILLIN, SULFA AND BACTRIM. HOME MEDICATIONS:  Glucophage 1000 mg twice a day, Lasix 20 mg daily, Entresto 1 tablet twice a day, Coreg 12.5 mg twice a day. FAMILY HISTORY:  Noncontributory.     SOCIAL HISTORY:  Former smoker. No history of alcohol abuse. Lives in [place]. REVIEW OF SYSTEMS:  A 10-point review of systems was negative except as mentioned in the HPI. PHYSICAL EXAMINATION:  GENERAL:  Patient is alert, fully oriented. VITAL SIGNS:  Blood pressure 149/72, temperature 98, pulse is 96. NEUROLOGIC:  Speech is clear. Comprehension is normal.  Pupils equal, round, reactive. Extraocular movements are full. Face is symmetric. Tongue is midline. Hearing is baseline. Muscle tone and bulk normal.  Strength normal in all extremities. Deep tendon reflexes 2/2 and symmetric. Toes downgoing. Sensation intact. Gait baseline. HEART:  Regular rate and rhythm. LUNGS:  Clear. ABDOMEN:  Soft, nontender, positive bowel sounds. EXTREMITIES:  No edema. LABORATORY DATA:  , troponin less than 0.05. CBC, CMP is pending. MRI scan of the brain does not show any acute abnormalities. There is nonspecific white matter ischemic change. MRA of the brain is negative. ASSESSMENT AND PLAN:  A 68-year-old female admitted after an episode of syncope. I suspect that this was vasovagal phenomenon or related to hypotension given the fact that she may have taken her morning medications twice as these may have caused the hypotension. She remained in the upright sitting position after passing out and did not come to until she was laid down supine. However, I agree with the complete workup as ordered including carotid duplex and echocardiogram.  Do not strongly suspect a seizure and there is no previous history. EEG would be appropriate, but can be done as an outpatient. If the workup is negative, she could be discharged home. I did discuss with her about state laws that restrict driving for 6 months after any unexplained loss of consciousness or a blackout. Please feel free to contact me with any further questions. Thank you for this consultation.       Bobby Mendoza MD       ASS / ELAINE  D: 11/17/2018 13:02     T: 11/17/2018 21:03  JOB #: [id number]

## 2018-11-18 NOTE — DISCHARGE INSTRUCTIONS
Fainting: Care Instructions  Your Care Instructions    When you faint, or pass out, you lose consciousness for a short time. A brief drop in blood flow to the brain often causes it. When you fall or lie down, more blood flows to your brain and you regain consciousness. Emotional stress, pain, or overheating--especially if you have been standing--can make you faint. In these cases, fainting is usually not serious. But fainting can be a sign of a more serious problem. Your doctor may want you to have more tests to rule out other causes. The treatment you need depends on the reason why you fainted. The doctor has checked you carefully, but problems can develop later. If you notice any problems or new symptoms, get medical treatment right away. Follow-up care is a key part of your treatment and safety. Be sure to make and go to all appointments, and call your doctor if you are having problems. It's also a good idea to know your test results and keep a list of the medicines you take. How can you care for yourself at home? · Drink plenty of fluids to prevent dehydration. If you have kidney, heart, or liver disease and have to limit fluids, talk with your doctor before you increase your fluid intake. When should you call for help? Call 911 anytime you think you may need emergency care. For example, call if:    · You have symptoms of a heart problem. These may include:  ? Chest pain or pressure. ? Severe trouble breathing. ? A fast or irregular heartbeat. ? Lightheadedness or sudden weakness. ? Coughing up pink, foamy mucus. ? Passing out. After you call 911, the  may tell you to chew 1 adult-strength or 2 to 4 low-dose aspirin. Wait for an ambulance. Do not try to drive yourself.     · You have symptoms of a stroke. These may include:  ? Sudden numbness, tingling, weakness, or loss of movement in your face, arm, or leg, especially on only one side of your body. ? Sudden vision changes.   ? Sudden trouble speaking. ? Sudden confusion or trouble understanding simple statements. ? Sudden problems with walking or balance. ? A sudden, severe headache that is different from past headaches.     · You passed out (lost consciousness) again.    Watch closely for changes in your health, and be sure to contact your doctor if:    · You do not get better as expected. Where can you learn more? Go to http://ashvin-gayathri.info/. Enter H468 in the search box to learn more about \"Fainting: Care Instructions. \"  Current as of: November 20, 2017  Content Version: 11.8  © 8697-8686 [employer]. Care instructions adapted under license by [employer] (which disclaims liability or warranty for this information). If you have questions about a medical condition or this instruction, always ask your healthcare professional. [place] [address] any warranty or liability for your use of this information. Discharge Instructions       PATIENT ID: Raj Munguia  MRN: [id number]   YOB: 1945    DATE OF ADMISSION: 11/17/2018  5:56 AM    DATE OF DISCHARGE: 11/18/2018    PRIMARY CARE PROVIDER: Unknown, Provider     ATTENDING PHYSICIAN: Azar Martinez MD  DISCHARGING PROVIDER: Rosibel Aguirre MD    To contact this individual call [phone] and ask the  to page. If unavailable ask to be transferred the Adult Hospitalist Department.     DISCHARGE DIAGNOSES   Syncope, likely due to taking meds twice  Patient refused CT angio chest  MRI brain/MRA head /carotid dopplers: no stroke, no other acute pathology  CT face: no fractures    CONSULTATIONS: IP CONSULT TO NEUROLOGY    PROCEDURES/SURGERIES: * No surgery found *    PENDING TEST RESULTS:   At the time of discharge the following test results are still pending: n/a    FOLLOW UP APPOINTMENTS:   Follow-up Information     Follow up With Specialties Details Why Contact Info    Unknown, Provider    Patient not available to ask      Unknown, Provider   for post-hospitalization follow up, with in 7 days Patient not available to ask             ADDITIONAL CARE RECOMMENDATIONS:   · It is important that you take the medication exactly as they are prescribed. · Keep your medication in the bottles provided by the pharmacist and keep a list of the medication names, dosages, and times to be taken in your wallet. · Do not take other medications without consulting your doctor. · No drinking alcohol or driving car or operating machinery if you are on narcotic pain medications. Donot take sedating mediations if you are sleepy or confused. · Fall Precautions  · Keep Well Hydrated  · Report to your medical provider if you feel you have  developed allergies to medications  · Follow up with your PCP or Consultant for medication adjustments and refills  · Monitor for signs of fevers,chills,bleeding,chest pain and seek medical attention if you do so. · You may need Holter monitor if syncope happens recurrently  · Please do not drive for at least a week and may be more if you dont feel better. Please talk to PCP and /or you cardiologist  · Please follow up with your PCP and cardiologist soon. And discuss this admission  ·       DIET: Cardiac Diet and Diabetic Diet  Oral Nutritional Supplements:       ACTIVITY: Ambulate in house and fall precautions    WOUND CARE: n/a    EQUIPMENT needed: n/a      DISCHARGE MEDICATIONS:   See Medication Reconciliation Form    · It is important that you take the medication exactly as they are prescribed. · Keep your medication in the bottles provided by the pharmacist and keep a list of the medication names, dosages, and times to be taken in your wallet. · Do not take other medications without consulting your doctor. NOTIFY YOUR PHYSICIAN FOR ANY OF THE FOLLOWING:   Fever over 101 degrees for 24 hours.    Chest pain, shortness of breath, fever, chills, nausea, vomiting, diarrhea, change in mentation, falling, weakness, bleeding. Severe pain or pain not relieved by medications. Or, any other signs or symptoms that you may have questions about.       DISPOSITION:  x  Home With:   OT  PT  [facility]  RN       SNF/Inpatient Rehab/LTAC    Independent/assisted living    Hospice    Other:         Signed:   Dacia Khan MD  11/18/2018  9:50 AM

## 2018-11-18 NOTE — PROGRESS NOTES
This RN explained to patient why the MD had ordered a CTA of the chest; patient verbalized understanding and was agreeable to do the CTA. Sent patient to do CTA of chest. CT called to say patient was refusing the test. This RN explained again to the CT staff member why patient was getting the test and to explain again to the patient. The CT staff member stated she would do so but if she refused again she was sending the patient back up. Patient again refused and was returned to her room (658). When asked why she refused, patient stated that the staff were telling her she was getting the test because she had traveled. Patient stated she had only traveled 2 hours from [place]. Then staff said it must be because she was having chest pain. Patient denied this. Per the patient, the radiologist then came and told her that it was probably to rule out a PE and that doctors order these kinds of tests just in case. Patient stated she was not having any symptoms and did not feel the test was necessary; she also did not want to get the contrast dye if she did not feel it was necessary. This RN advised the patient yet again the reasoning for the test, to rule out a PE, etc. That could have caused her collapse. Also told patient she does have the right to refuse testing/treatment. Patient verbalized understanding. VSS; patient comfortable in bed; call bell in reach. Will continue to monitor.